# Patient Record
Sex: FEMALE | Race: BLACK OR AFRICAN AMERICAN | NOT HISPANIC OR LATINO | Employment: OTHER | URBAN - METROPOLITAN AREA
[De-identification: names, ages, dates, MRNs, and addresses within clinical notes are randomized per-mention and may not be internally consistent; named-entity substitution may affect disease eponyms.]

---

## 2020-09-01 ENCOUNTER — TELEPHONE (OUTPATIENT)
Dept: PAIN MEDICINE | Facility: CLINIC | Age: 35
End: 2020-09-01
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
## 2020-09-01 NOTE — TELEPHONE ENCOUNTER
Patient was contacted and informed of office IPM and offered her an appt with one of the providers. Patient declined she stated she is interested in medication mangement only.

## 2020-09-01 NOTE — TELEPHONE ENCOUNTER
----- Message from Gladys Boyce sent at 9/1/2020  3:18 PM CDT -----  Regarding: Call Back  Name of Who is Calling : ALCIDES CASTRO [[id number]]    Patient is requesting a call from staff in regards to being seen states she has a referral from her primary Dr. Yovana Almendarez ph [phone] and would like to schedule to established care  .....Please contact to further discuss and advise.    Can the clinic reply by [facility] : No    What Number to Call Back :  [phone]